# Patient Record
Sex: MALE | URBAN - METROPOLITAN AREA
[De-identification: names, ages, dates, MRNs, and addresses within clinical notes are randomized per-mention and may not be internally consistent; named-entity substitution may affect disease eponyms.]

---

## 2022-06-17 ENCOUNTER — APPOINTMENT (RX ONLY)
Dept: URBAN - METROPOLITAN AREA CLINIC 17 | Facility: CLINIC | Age: 27
Setting detail: DERMATOLOGY
End: 2022-06-17

## 2022-06-17 DIAGNOSIS — L65.9 NONSCARRING HAIR LOSS, UNSPECIFIED: ICD-10-CM

## 2022-06-17 DIAGNOSIS — L57.8 OTHER SKIN CHANGES DUE TO CHRONIC EXPOSURE TO NONIONIZING RADIATION: ICD-10-CM

## 2022-06-17 PROCEDURE — 99203 OFFICE O/P NEW LOW 30 MIN: CPT

## 2022-06-17 PROCEDURE — ? COUNSELING

## 2022-06-17 PROCEDURE — ? PRESCRIPTION

## 2022-06-17 RX ORDER — TRETIONIN 0.25 MG/G
CREAM TOPICAL QHS
Qty: 20 | Refills: 6 | Status: ERX | COMMUNITY
Start: 2022-06-17

## 2022-06-17 RX ORDER — DUTASTERIDE 0.5 MG/1
0.5 MG CAPSULE, LIQUID FILLED ORAL QD
Qty: 30 | Refills: 11 | Status: ERX | COMMUNITY
Start: 2022-06-17

## 2022-06-17 RX ADMIN — DUTASTERIDE 0.5 MG: 0.5 CAPSULE, LIQUID FILLED ORAL at 00:00

## 2022-06-17 RX ADMIN — TRETIONIN 1: 0.25 CREAM TOPICAL at 00:00

## 2022-06-17 NOTE — HPI: HAIR LOSS
Previous Labs: Yes
How Did The Hair Loss Occur?: gradual in onset
How Severe Is Your Hair Loss?: mild
Lab Details: Reports he had testosterone levels and sperm count checked and they are normal.

## 2022-06-20 ENCOUNTER — RX ONLY (OUTPATIENT)
Age: 27
Setting detail: RX ONLY
End: 2022-06-20

## 2022-06-20 RX ORDER — TRETIONIN 0.25 MG/G
CREAM TOPICAL QHS
Qty: 20 | Refills: 6 | Status: ERX

## 2022-06-20 RX ORDER — DUTASTERIDE 0.5 MG/1
0.5 MG CAPSULE, LIQUID FILLED ORAL QD
Qty: 30 | Refills: 11 | Status: ERX

## 2022-09-19 ENCOUNTER — RX ONLY (OUTPATIENT)
Age: 27
Setting detail: RX ONLY
End: 2022-09-19

## 2022-09-19 RX ORDER — TRETIONIN 0.25 MG/G
CREAM TOPICAL QHS
Qty: 20 | Refills: 3 | Status: ERX

## 2022-09-19 RX ORDER — TRETIONIN 0.25 MG/G
CREAM TOPICAL QHS
Qty: 20 | Refills: 6 | Status: CANCELLED
Stop reason: CLARIF

## 2022-09-19 RX ORDER — DUTASTERIDE 0.5 MG/1
0.5 MG CAPSULE, LIQUID FILLED ORAL QD
Qty: 30 | Refills: 6 | Status: ERX

## 2022-09-19 RX ORDER — DUTASTERIDE 0.5 MG/1
0.5 MG CAPSULE, LIQUID FILLED ORAL QD
Qty: 30 | Refills: 11 | Status: CANCELLED
Stop reason: CLARIF